# Patient Record
Sex: FEMALE | Race: BLACK OR AFRICAN AMERICAN | NOT HISPANIC OR LATINO | URBAN - METROPOLITAN AREA
[De-identification: names, ages, dates, MRNs, and addresses within clinical notes are randomized per-mention and may not be internally consistent; named-entity substitution may affect disease eponyms.]

---

## 2023-07-29 PROBLEM — O99.112: Status: ACTIVE | Noted: 2023-07-29

## 2023-07-29 PROBLEM — R03.0 BORDERLINE HYPERTENSION: Status: ACTIVE | Noted: 2023-07-29

## 2023-07-29 PROBLEM — D57.3 SICKLE CELL TRAIT (CMS-HCC): Status: ACTIVE | Noted: 2023-07-29

## 2023-07-29 PROBLEM — R30.0 DYSURIA: Status: ACTIVE | Noted: 2023-07-29

## 2023-07-29 PROBLEM — O99.210 OBESITY IN PREGNANCY (HHS-HCC): Status: RESOLVED | Noted: 2023-07-29 | Resolved: 2023-07-29

## 2023-07-29 PROBLEM — D68.59: Status: ACTIVE | Noted: 2023-07-29

## 2023-07-29 RX ORDER — DIAPER,BRIEF,ADULT, DISPOSABLE
EACH MISCELLANEOUS
COMMUNITY

## 2023-07-31 ENCOUNTER — OFFICE VISIT (OUTPATIENT)
Dept: PRIMARY CARE | Facility: CLINIC | Age: 28
End: 2023-07-31
Payer: MEDICAID

## 2023-07-31 VITALS
HEART RATE: 60 BPM | HEIGHT: 64 IN | BODY MASS INDEX: 35.85 KG/M2 | WEIGHT: 210 LBS | SYSTOLIC BLOOD PRESSURE: 107 MMHG | DIASTOLIC BLOOD PRESSURE: 72 MMHG

## 2023-07-31 DIAGNOSIS — L73.9 FOLLICULITIS: Primary | ICD-10-CM

## 2023-07-31 DIAGNOSIS — N76.0 BACTERIAL VAGINOSIS: ICD-10-CM

## 2023-07-31 DIAGNOSIS — B96.89 BACTERIAL VAGINOSIS: ICD-10-CM

## 2023-07-31 PROCEDURE — 99214 OFFICE O/P EST MOD 30 MIN: CPT | Performed by: FAMILY MEDICINE

## 2023-07-31 PROCEDURE — 1036F TOBACCO NON-USER: CPT | Performed by: FAMILY MEDICINE

## 2023-07-31 RX ORDER — SULFAMETHOXAZOLE AND TRIMETHOPRIM 800; 160 MG/1; MG/1
1 TABLET ORAL 2 TIMES DAILY
Qty: 20 TABLET | Refills: 0 | Status: SHIPPED | OUTPATIENT
Start: 2023-07-31 | End: 2023-08-10

## 2023-07-31 ASSESSMENT — ENCOUNTER SYMPTOMS
RESPIRATORY NEGATIVE: 1
NAUSEA: 0
ENDOCRINE NEGATIVE: 1
DEPRESSION: 0
CARDIOVASCULAR NEGATIVE: 1
CHILLS: 0
HEMATOLOGIC/LYMPHATIC NEGATIVE: 1
OCCASIONAL FEELINGS OF UNSTEADINESS: 0
LOSS OF SENSATION IN FEET: 0
MUSCULOSKELETAL NEGATIVE: 1
VOMITING: 0
GASTROINTESTINAL NEGATIVE: 1
ALLERGIC/IMMUNOLOGIC NEGATIVE: 1
PSYCHIATRIC NEGATIVE: 1
FEVER: 0
NEUROLOGICAL NEGATIVE: 1

## 2023-07-31 ASSESSMENT — PATIENT HEALTH QUESTIONNAIRE - PHQ9
SUM OF ALL RESPONSES TO PHQ9 QUESTIONS 1 AND 2: 0
1. LITTLE INTEREST OR PLEASURE IN DOING THINGS: NOT AT ALL
2. FEELING DOWN, DEPRESSED OR HOPELESS: NOT AT ALL

## 2023-07-31 NOTE — PROGRESS NOTES
"Subjective   Patient ID: Marylu Villela is a 27 y.o. female who presents for Follow-up (Vaginal acne ).      Female  Problem  The patient's primary symptoms include genital itching, genital lesions and a genital odor. This is a recurrent problem. The current episode started more than 1 month ago. The problem occurs intermittently. The problem has been waxing and waning. The patient is experiencing no pain. The problem affects both sides. She is not pregnant. Associated symptoms include rash. Pertinent negatives include no chills, fever, nausea or vomiting. Nothing aggravates the symptoms. She is sexually active with multiple partners. It is possible that her partner has an STD. She uses nothing and condoms for contraception. Her menstrual history has been regular.    Has had episodes of folluculitis of the mons pubis.   Also c/o vag odor  ariane after periods for 3 days   Current Outpatient Medications on File Prior to Visit   Medication Sig Dispense Refill    multivit-iron-min-folic acid (One Daily For Women) 18-0.4 mg tablet Take by mouth.       No current facility-administered medications on file prior to visit.        Review of Systems   Constitutional:  Negative for chills and fever.   HENT: Negative.     Respiratory: Negative.     Cardiovascular: Negative.    Gastrointestinal: Negative.  Negative for nausea and vomiting.   Endocrine: Negative.    Genitourinary: Negative.    Musculoskeletal: Negative.    Skin:  Positive for rash.   Allergic/Immunologic: Negative.    Neurological: Negative.    Hematological: Negative.    Psychiatric/Behavioral: Negative.     All other systems reviewed and are negative.      Objective   Blood Pressure 107/72   Pulse 60   Height 1.626 m (5' 4\")   Weight 95.3 kg (210 lb)   Body Mass Index 36.05 kg/m²   BSA: 2.07 meters squared  Growth percentiles: Facility age limit for growth %hayley is 20 years. Facility age limit for growth %hayley is 20 years.   No visits with results within " 1 Week(s) from this visit.   Latest known visit with results is:   Legacy Encounter on 10/27/2022   Component Date Value Ref Range Status    Neisseria gonorrhea,Amplified 10/27/2022 NEGATIVE  Negative Final    Comment:  The APTIMA Combo 2 assay is FDA-approved for Chlamydia    trachomatis and Neisseria gonorrhoeae testing on female    endocervical and vaginal swabs, ThinPrep liquid pap    samples, male urine samples and urethral swabs.    Performance characteristics for Chlamydia trachomatis and    Neisseria gonorrhoeae testing on specific non-FDA-approved    sample types (female urine samples) have been validated by    [facility]. This    laboratory is certified by CLIA to perform high complexity    testing. Samples from all other sites are not validated    for this method.  This assay has not been FDA-approved or validated for this sample type.    Results should be interpreted with caution in conjunction with additional   clinical and laboratory findings.       Chlamydia trachomatis, Amplified 10/27/2022 NEGATIVE  Negative Final    Comment:  The APTIMA Combo 2 assay is FDA-approved for Chlamydia    trachomatis and Neisseria gonorrhoeae testing on female    endocervical and vaginal swabs, ThinPrep liquid pap    samples, male urine samples and urethral swabs.    Performance characteristics for Chlamydia trachomatis and    Neisseria gonorrhoeae testing on specific non-FDA-approved    sample types (female urine samples) have been validated by    [facility]. This    laboratory is certified by CLIA to perform high complexity    testing. Samples from all other sites are not validated    for this method.  This assay has not been FDA-approved or validated for this sample type.    Results should be interpreted with caution in conjunction with additional   clinical and laboratory findings.       Trichomonas Vaginalis 10/27/2022 NEGATIVE  Negative Final     Comment:  The APTIMA Trichomonas vaginalis assay is FDA-approved for    testing on female endocervical swabs, vaginal swabs, and    ThinPrep liquid pap samples. Performance characteristics    for Trichomonas vaginalis on specific non-FDA-approved    sample types (female and male urine and male urethral    swabs) have been validated by [facility]. This laboratory is certified by    CLIA to perform high complexity testing. Samples from all    other sites are not validated for this method.  This assay has not been FDA-approved or validated for this sample type.    Results should be interpreted with caution in conjunction with additional   clinical and laboratory findings.         Physical Exam  Constitutional:       Appearance: Normal appearance.   HENT:      Head: Normocephalic and atraumatic.   Cardiovascular:      Rate and Rhythm: Normal rate and regular rhythm.   Pulmonary:      Effort: Pulmonary effort is normal.      Breath sounds: Normal breath sounds.   Neurological:      Mental Status: She is alert.         Assessment/Plan   Problem List Items Addressed This Visit       Folliculitis - Primary    Relevant Medications    sulfamethoxazole-trimethoprim (Bactrim DS) 800-160 mg tablet    Bacterial vaginosis     Boric acid suppositories. For prevention

## 2023-09-14 ENCOUNTER — OFFICE VISIT (OUTPATIENT)
Dept: PRIMARY CARE | Facility: CLINIC | Age: 28
End: 2023-09-14
Payer: MEDICAID

## 2023-09-14 VITALS
HEIGHT: 64 IN | DIASTOLIC BLOOD PRESSURE: 71 MMHG | HEART RATE: 62 BPM | SYSTOLIC BLOOD PRESSURE: 107 MMHG | BODY MASS INDEX: 36.02 KG/M2 | WEIGHT: 211 LBS

## 2023-09-14 DIAGNOSIS — Z11.3 SCREEN FOR STD (SEXUALLY TRANSMITTED DISEASE): ICD-10-CM

## 2023-09-14 DIAGNOSIS — L73.9 FOLLICULITIS: Primary | ICD-10-CM

## 2023-09-14 DIAGNOSIS — Z20.2 STD EXPOSURE: ICD-10-CM

## 2023-09-14 LAB
HEPATITIS B VIRUS SURFACE AG PRESENCE IN SERUM: NONREACTIVE
HIV 1/ 2 AG/AB SCREEN: NONREACTIVE
SYPHILIS TOTAL AB: NONREACTIVE

## 2023-09-14 PROCEDURE — 86780 TREPONEMA PALLIDUM: CPT

## 2023-09-14 PROCEDURE — 87340 HEPATITIS B SURFACE AG IA: CPT

## 2023-09-14 PROCEDURE — 99214 OFFICE O/P EST MOD 30 MIN: CPT | Performed by: FAMILY MEDICINE

## 2023-09-14 PROCEDURE — 87389 HIV-1 AG W/HIV-1&-2 AB AG IA: CPT

## 2023-09-14 PROCEDURE — 1036F TOBACCO NON-USER: CPT | Performed by: FAMILY MEDICINE

## 2023-09-14 PROCEDURE — 87661 TRICHOMONAS VAGINALIS AMPLIF: CPT

## 2023-09-14 PROCEDURE — 87591 N.GONORRHOEAE DNA AMP PROB: CPT

## 2023-09-14 PROCEDURE — 87491 CHLMYD TRACH DNA AMP PROBE: CPT

## 2023-09-14 ASSESSMENT — ENCOUNTER SYMPTOMS
LOSS OF SENSATION IN FEET: 0
OCCASIONAL FEELINGS OF UNSTEADINESS: 0
DEPRESSION: 0

## 2023-09-14 ASSESSMENT — PATIENT HEALTH QUESTIONNAIRE - PHQ9
1. LITTLE INTEREST OR PLEASURE IN DOING THINGS: NOT AT ALL
2. FEELING DOWN, DEPRESSED OR HOPELESS: NOT AT ALL
SUM OF ALL RESPONSES TO PHQ9 QUESTIONS 1 AND 2: 0

## 2023-09-15 ENCOUNTER — TELEPHONE (OUTPATIENT)
Dept: PRIMARY CARE | Facility: CLINIC | Age: 28
End: 2023-09-15
Payer: MEDICAID

## 2023-09-15 LAB
CHLAMYDIA TRACH., AMPLIFIED: NEGATIVE
N. GONORRHEA, AMPLIFIED: NEGATIVE
TRICHOMONAS VAGINALIS: NEGATIVE

## 2023-09-16 PROBLEM — Z20.2 STD EXPOSURE: Status: ACTIVE | Noted: 2023-09-16

## 2023-09-16 PROBLEM — Z11.3 SCREEN FOR STD (SEXUALLY TRANSMITTED DISEASE): Status: ACTIVE | Noted: 2023-09-16

## 2023-09-16 NOTE — PROGRESS NOTES
"Assessment and Plan:  Problem List Items Addressed This Visit       Folliculitis - Primary    STD exposure    Relevant Orders    HIV 1/2 Antigen/Antibody Screen with Reflex to Confirmation (Completed)    Syphilis Screen with Reflex (Completed)    Hepatitis B surface antigen (Completed)    C. Trachomatis / N. Gonorrhoeae, Amplified Detection (Completed)    Trichomonas vaginalis, Amplified (Completed)    Screen for STD (sexually transmitted disease)    Relevant Orders    HIV 1/2 Antigen/Antibody Screen with Reflex to Confirmation (Completed)    Syphilis Screen with Reflex (Completed)    Hepatitis B surface antigen (Completed)    C. Trachomatis / N. Gonorrhoeae, Amplified Detection (Completed)    Trichomonas vaginalis, Amplified (Completed)     We will recheck for STDs in 2 weeks or if symptoms appear    HPI:  Patient was exposed to STD the present treated with Bactrim for follow-up colitis which has resolved      ROS   Constitutional:  o weight loss. Negative for chills and fever.   HENT: Negative.     Respiratory: Negative.     Cardiovascular: Negative.    Gastrointestinal: Negative.  Negative for nausea and vomiting.   Endocrine: Negative.    Genitourinary: Negative.    Musculoskeletal: Negative.    Skin: Negative.  Negative for rash.   Allergic/Immunologic: Negative.    Neurological: Negative.    Hematological: Negative.    Psychiatric/Behavioral: Negative.     All other systems reviewed and are negative.      Blood Pressure 107/71   Pulse 62   Height 1.626 m (5' 4\")   Weight 95.7 kg (211 lb)   Body Mass Index 36.22 kg/m²   Body mass index is 36.22 kg/m².  Physical Exam    ENT:      Head: Normocephalic and atraumatic.      Right Ear: Tympanic membrane normal.      Left Ear: Tympanic membrane normal.      Nose: Nose normal.      Mouth/Throat:      Mouth: Mucous membranes are moist.   Eyes:      Pupils: Pupils are equal, round, and reactive to light.   Cardiovascular:      Rate and Rhythm: Normal rate and regular " rhythm.      Pulses: Normal pulses.      Heart sounds: Normal heart sounds.   Pulmonary:      Effort: Pulmonary effort is normal.      Breath sounds: Normal breath sounds.   Abdominal:      General: Abdomen is flat. Bowel sounds are normal.      Palpations: Abdomen is soft.   Musculoskeletal:         General: Normal range of motion.      Cervical back: Normal range of motion and neck supple.   Skin:     General: Skin is warm and dry.      Capillary Refill: Capillary refill takes less than 2 seconds.   Neurological:      General: No focal deficit present.      Mental Status: She is alert and oriented to person, place, and time.   Psychiatric:         Mood and Affect: Mood normal.       Active Problem List  Patient Active Problem List   Diagnosis    Borderline hypertension    Dysuria    Sickle cell trait (CMS/HCC)    Thrombophilia during pregnancy in second trimester (CMS/HCC)    Refractive error    Folliculitis    Bacterial vaginosis    STD exposure    Screen for STD (sexually transmitted disease)       Comprehensive Medical/Surgical/Social/Family History  Past Medical History:   Diagnosis Date    Obesity in pregnancy 07/29/2023     No past surgical history on file.  Social History     Social History Narrative    Not on file       Allergies and Medications  Ibuprofen and Naproxen sodium  Current Outpatient Medications   Medication Instructions    multivit-iron-min-folic acid (One Daily For Women) 18-0.4 mg tablet oral

## 2023-12-19 ENCOUNTER — OFFICE VISIT (OUTPATIENT)
Dept: PRIMARY CARE | Facility: CLINIC | Age: 28
End: 2023-12-19
Payer: MEDICAID

## 2023-12-19 VITALS
WEIGHT: 206 LBS | HEIGHT: 64 IN | BODY MASS INDEX: 35.17 KG/M2 | DIASTOLIC BLOOD PRESSURE: 79 MMHG | SYSTOLIC BLOOD PRESSURE: 116 MMHG | HEART RATE: 83 BPM

## 2023-12-19 DIAGNOSIS — Z20.2 STD EXPOSURE: ICD-10-CM

## 2023-12-19 DIAGNOSIS — K59.00 CONSTIPATION, UNSPECIFIED CONSTIPATION TYPE: ICD-10-CM

## 2023-12-19 DIAGNOSIS — D57.3 SICKLE CELL TRAIT (CMS-HCC): ICD-10-CM

## 2023-12-19 DIAGNOSIS — N93.9 ABNORMAL UTERINE BLEEDING (AUB): ICD-10-CM

## 2023-12-19 DIAGNOSIS — Z11.3 SCREEN FOR STD (SEXUALLY TRANSMITTED DISEASE): ICD-10-CM

## 2023-12-19 DIAGNOSIS — Z00.00 ROUTINE GENERAL MEDICAL EXAMINATION AT A HEALTH CARE FACILITY: Primary | ICD-10-CM

## 2023-12-19 LAB
ERYTHROCYTE [DISTWIDTH] IN BLOOD BY AUTOMATED COUNT: 13.2 % (ref 11.5–14.5)
HCT VFR BLD AUTO: 37.7 % (ref 36–46)
HGB BLD-MCNC: 12.4 G/DL (ref 12–16)
MCH RBC QN AUTO: 30.2 PG (ref 26–34)
MCHC RBC AUTO-ENTMCNC: 32.9 G/DL (ref 32–36)
MCV RBC AUTO: 92 FL (ref 80–100)
NRBC BLD-RTO: 0 /100 WBCS (ref 0–0)
PLATELET # BLD AUTO: 279 X10*3/UL (ref 150–450)
RBC # BLD AUTO: 4.11 X10*6/UL (ref 4–5.2)
WBC # BLD AUTO: 9.2 X10*3/UL (ref 4.4–11.3)

## 2023-12-19 PROCEDURE — 84702 CHORIONIC GONADOTROPIN TEST: CPT

## 2023-12-19 PROCEDURE — 87389 HIV-1 AG W/HIV-1&-2 AB AG IA: CPT

## 2023-12-19 PROCEDURE — 1036F TOBACCO NON-USER: CPT | Performed by: FAMILY MEDICINE

## 2023-12-19 PROCEDURE — 85027 COMPLETE CBC AUTOMATED: CPT

## 2023-12-19 PROCEDURE — 36415 COLL VENOUS BLD VENIPUNCTURE: CPT

## 2023-12-19 PROCEDURE — 82607 VITAMIN B-12: CPT

## 2023-12-19 PROCEDURE — 84443 ASSAY THYROID STIM HORMONE: CPT

## 2023-12-19 PROCEDURE — 80061 LIPID PANEL: CPT

## 2023-12-19 PROCEDURE — 82306 VITAMIN D 25 HYDROXY: CPT

## 2023-12-19 PROCEDURE — 86780 TREPONEMA PALLIDUM: CPT

## 2023-12-19 PROCEDURE — 80053 COMPREHEN METABOLIC PANEL: CPT

## 2023-12-19 PROCEDURE — 99395 PREV VISIT EST AGE 18-39: CPT | Performed by: FAMILY MEDICINE

## 2023-12-19 RX ORDER — HYDROCORTISONE 25 MG/G
CREAM TOPICAL 2 TIMES DAILY
Qty: 28 G | Refills: 1 | Status: SHIPPED | OUTPATIENT
Start: 2023-12-19 | End: 2024-05-11 | Stop reason: ALTCHOICE

## 2023-12-19 ASSESSMENT — ENCOUNTER SYMPTOMS
DEPRESSION: 0
LOSS OF SENSATION IN FEET: 0
OCCASIONAL FEELINGS OF UNSTEADINESS: 0

## 2023-12-19 NOTE — PROGRESS NOTES
"  Subjective     Patient ID: Marylu Villela is a 28 y.o. female who presents for Annual Exam.      Last Physical :  1 Years ago     Pt's PMH, PSH, SH, FH , meds and allergies was obtained / reviewed and updated .     Dental visits : Y  Vision issues : N  Hearing issues : N    Immunizations : Y    Diet :  could be better  Exercise:  Weight concerns :     Alcohol: as noted in SH  Tobacco: as noted in SH  Recreational drug use : None/ as noted in SH    Sexually active : Active   Contraception :   Menstrual problems:  Premenopausal/perimenopausal/ postmenopausal:    G:  Parity:  Full term:    Premature:   (s):   Living :  Ab induced:   Ab spontaneous :  Ectopic :   Multiple :    PAP smear :  Mammogram :  Colonoscopy:    Metabolic screening   - Lipid   - Glucose    Preg  Light period last  Friday  Nausea constipation.     ======================================================    Visit Vitals  Blood Pressure 116/79   Pulse 83   Height 1.626 m (5' 4\")   Weight 93.4 kg (206 lb)   Body Mass Index 35.36 kg/m²   Smoking Status Never   Body Surface Area 2.05 m²      No LMP recorded.     =====================================    Review of systems:  Constitutional: no chills, no fever and no night sweats.     Eyes: no blurred vision and no eyesight problems.     ENT: no hearing loss, no nasal congestion, no nasal discharge, no hoarseness and no sore throat.     Cardiovascular: no chest pain, no intermittent leg claudication, no lower extremity edema, no palpitations and no syncope.     Respiratory: no cough, no shortness of breath during exertion, no shortness of breath at rest and no wheezing.     Gastrointestinal: no abdominal pain, no blood in stools, no constipation, no diarrhea, no melena, no nausea, no rectal pain and no vomiting.     Genitourinary: no dysuria, no change in urinary frequency, no urinary hesitancy, no feelings of urinary urgency and no vaginal discharge.     Musculoskeletal: no arthralgias, no " back pain and no myalgias.     Integumentary: no new skin lesions and no rashes.     Neurological: no difficulty walking, no headache, no limb weakness, no numbness and no tingling.     Psychiatric: no anxiety, no depression, no anhedonia and no substance use disorders.     Endocrine: no recent weight gain and no recent weight loss.     Hematologic/Lymphatic: no tendency for easy bruising and no swollen glands.   ============================================================    Physical exam :    Constitutional: Alert and in no acute distress. Well developed, well nourished.     Eyes: Normal external exam. Pupils were equal in size, round, reactive to light (PERRL) with normal accommodation and extraocular movements intact (EOMI).     Ears, Nose, Mouth, and Throat: External inspection of ears and nose: Normal.  Otoscopic examination: Normal.      Neck: No neck mass was observed. Supple.     Cardiovascular: Heart rate and rhythm were normal, normal S1 and S2, no gallops, no murmurs and no pericardial rub    Pulmonary: No respiratory distress. Clear bilateral breath sounds.     Abdomen: Soft nontender; no abdominal mass palpated. No organomegaly.     Musculoskeletal: No joint swelling seen, normal movements of all extremities. Range of motion: Normal.  Muscle strength/tone: Normal.      Skin: Normal skin color and pigmentation, normal skin turgor, and no rash.     Neurologic: Deep tendon reflexes were 2+ and symmetric. Sensation: Normal.     Psychiatric: Judgment and insight: Intact. Mood and affect: Normal.    Lymphatic : Cervical/ axillary/ groin Lns Palpable/ non palpable            All other systems have been reviewed and are negative for complaint.      Assessment/Plan    Problem List Items Addressed This Visit             ICD-10-CM    Sickle cell trait (CMS/HCC) D57.3    Relevant Orders    Vitamin D 25-Hydroxy,Total (for eval of Vitamin D levels)    Constipation K59.00    Relevant Medications    hydrocortisone  (Anusol-HC) 2.5 % rectal cream    Routine general medical examination at a health care facility - Primary Z00.00    Relevant Orders    TSH with reflex to Free T4 if abnormal    Lipid Panel    Comprehensive Metabolic Panel    CBC    Abnormal uterine bleeding (AUB) N93.9    Relevant Orders    Vitamin D 25-Hydroxy,Total (for eval of Vitamin D levels)    Vitamin B12    HCG, quantitative, pregnancy

## 2023-12-20 DIAGNOSIS — E55.9 VITAMIN D DEFICIENCY: ICD-10-CM

## 2023-12-20 LAB
25(OH)D3 SERPL-MCNC: 15 NG/ML (ref 30–100)
ALBUMIN SERPL BCP-MCNC: 4.4 G/DL (ref 3.4–5)
ALP SERPL-CCNC: 40 U/L (ref 33–110)
ALT SERPL W P-5'-P-CCNC: 13 U/L (ref 7–45)
ANION GAP SERPL CALC-SCNC: 11 MMOL/L (ref 10–20)
AST SERPL W P-5'-P-CCNC: 12 U/L (ref 9–39)
B-HCG SERPL-ACNC: <3 MIU/ML
BILIRUB SERPL-MCNC: 0.4 MG/DL (ref 0–1.2)
BUN SERPL-MCNC: 15 MG/DL (ref 6–23)
CALCIUM SERPL-MCNC: 9.7 MG/DL (ref 8.6–10.6)
CHLORIDE SERPL-SCNC: 105 MMOL/L (ref 98–107)
CHOLEST SERPL-MCNC: 105 MG/DL (ref 0–199)
CHOLESTEROL/HDL RATIO: 2.4
CO2 SERPL-SCNC: 24 MMOL/L (ref 21–32)
CREAT SERPL-MCNC: 0.76 MG/DL (ref 0.5–1.05)
GFR SERPL CREATININE-BSD FRML MDRD: >90 ML/MIN/1.73M*2
GLUCOSE SERPL-MCNC: 96 MG/DL (ref 74–99)
HDLC SERPL-MCNC: 43.2 MG/DL
LDLC SERPL CALC-MCNC: 53 MG/DL
NON HDL CHOLESTEROL: 62 MG/DL (ref 0–149)
POTASSIUM SERPL-SCNC: 4.2 MMOL/L (ref 3.5–5.3)
PROT SERPL-MCNC: 7.4 G/DL (ref 6.4–8.2)
SODIUM SERPL-SCNC: 136 MMOL/L (ref 136–145)
TRIGL SERPL-MCNC: 45 MG/DL (ref 0–149)
TSH SERPL-ACNC: 1.56 MIU/L (ref 0.44–3.98)
VIT B12 SERPL-MCNC: 695 PG/ML (ref 211–911)
VLDL: 9 MG/DL (ref 0–40)

## 2023-12-21 LAB
HIV 1+2 AB+HIV1 P24 AG SERPL QL IA: NONREACTIVE
T PALLIDUM AB SER QL: NONREACTIVE

## 2023-12-22 ENCOUNTER — APPOINTMENT (OUTPATIENT)
Dept: PRIMARY CARE | Facility: CLINIC | Age: 28
End: 2023-12-22
Payer: MEDICAID

## 2023-12-26 RX ORDER — ERGOCALCIFEROL 1.25 MG/1
50000 CAPSULE ORAL
Qty: 13 CAPSULE | Refills: 1 | Status: SHIPPED | OUTPATIENT
Start: 2023-12-26 | End: 2024-05-11 | Stop reason: SDDI

## 2024-05-10 ENCOUNTER — OFFICE VISIT (OUTPATIENT)
Dept: PRIMARY CARE | Facility: CLINIC | Age: 29
End: 2024-05-10
Payer: MEDICAID

## 2024-05-10 VITALS
BODY MASS INDEX: 35.3 KG/M2 | OXYGEN SATURATION: 95 % | DIASTOLIC BLOOD PRESSURE: 69 MMHG | HEART RATE: 55 BPM | SYSTOLIC BLOOD PRESSURE: 106 MMHG | WEIGHT: 206.8 LBS | RESPIRATION RATE: 18 BRPM | HEIGHT: 64 IN

## 2024-05-10 DIAGNOSIS — Z00.00 ROUTINE GENERAL MEDICAL EXAMINATION AT A HEALTH CARE FACILITY: ICD-10-CM

## 2024-05-10 DIAGNOSIS — Z3A.01 LESS THAN 8 WEEKS GESTATION OF PREGNANCY (HHS-HCC): Primary | ICD-10-CM

## 2024-05-10 DIAGNOSIS — O21.9 NAUSEA AND VOMITING DURING PREGNANCY PRIOR TO 22 WEEKS GESTATION (HHS-HCC): ICD-10-CM

## 2024-05-10 LAB
POC APPEARANCE, URINE: ABNORMAL
POC BILIRUBIN, URINE: ABNORMAL
POC BLOOD, URINE: NEGATIVE
POC COLOR, URINE: YELLOW
POC GLUCOSE, URINE: NEGATIVE MG/DL
POC KETONES, URINE: ABNORMAL MG/DL
POC LEUKOCYTES, URINE: NEGATIVE
POC NITRITE,URINE: NEGATIVE
POC PH, URINE: 6.5 PH
POC PROTEIN, URINE: ABNORMAL MG/DL
POC SPECIFIC GRAVITY, URINE: 1.01
POC UROBILINOGEN, URINE: 0.2 EU/DL
PREGNANCY TEST URINE, POC: POSITIVE

## 2024-05-10 PROCEDURE — 81025 URINE PREGNANCY TEST: CPT | Performed by: FAMILY MEDICINE

## 2024-05-10 PROCEDURE — 81002 URINALYSIS NONAUTO W/O SCOPE: CPT | Performed by: FAMILY MEDICINE

## 2024-05-10 PROCEDURE — 99214 OFFICE O/P EST MOD 30 MIN: CPT | Performed by: FAMILY MEDICINE

## 2024-05-10 RX ORDER — SYRINGE WITH NEEDLE, INSULIN
SYRINGE, EMPTY DISPOSABLE MISCELLANEOUS
Qty: 100 TABLET | Refills: 3 | Status: SHIPPED | OUTPATIENT
Start: 2024-05-10

## 2024-05-10 RX ORDER — ONDANSETRON 4 MG/1
4 TABLET, FILM COATED ORAL EVERY 8 HOURS PRN
Qty: 40 TABLET | Refills: 0 | Status: SHIPPED | OUTPATIENT
Start: 2024-05-10 | End: 2024-05-30

## 2024-05-10 RX ORDER — ACETAMINOPHEN 500 MG
2000 TABLET ORAL DAILY
Qty: 100 CAPSULE | Refills: 3
Start: 2024-05-10 | End: 2025-05-05

## 2024-05-10 NOTE — PROGRESS NOTES
"Subjective   Patient ID: Marylu Villela is a 28 y.o. female who presents for New Patient Visit.      HPI patient is here for follow-up and acute visit states she had a positive pregnancy test at home last menstrual period was at the end of March expected date of confinement is  patient has had amenorrhea for 6 weeks has made appointment with GYN has not started prenatal vitamins yet complains of nausea which has been persistent  Eating healthy understands to gain too much weight has a 6-year-old pregnancy was uneventful [place]  due to prolonged labor  Current Outpatient Medications on File Prior to Visit   Medication Sig Dispense Refill    ergocalciferol (Vitamin D-2) 1.25 MG (72185 UT) capsule Take 1 capsule (50,000 Units) by mouth 1 (one) time per week. (Patient not taking: Reported on 5/10/2024) 13 capsule 1    hydrocortisone (Anusol-HC) 2.5 % rectal cream Insert into the rectum 2 times a day. Apply to affected areas (Patient not taking: Reported on 5/10/2024) 28 g 1    multivit-iron-min-folic acid (One Daily For Women) 18-0.4 mg tablet Take by mouth.       No current facility-administered medications on file prior to visit.        Review of Systems   Constitutional:  Negative for chills and fever.   HENT: Negative.     Respiratory: Negative.     Cardiovascular: Negative.    Gastrointestinal: Negative.  Negative for nausea and vomiting.   Endocrine: Negative.    Genitourinary: Negative.    Musculoskeletal: Negative.    Skin: Negative.  Negative for rash.   Allergic/Immunologic: Negative.    Neurological: Negative.    Hematological: Negative.    Psychiatric/Behavioral: Negative.     All other systems reviewed and are negative.      Objective   /69 (BP Location: Right arm, Patient Position: Sitting, BP Cuff Size: Adult)   Pulse 55   Resp 18   Ht 1.626 m (5' 4\")   Wt 93.8 kg (206 lb 12.8 oz)   LMP  (LMP Unknown)   SpO2 95%   BMI 35.50 kg/m²   BSA: 2.06 meters squared  Growth " percentiles: Facility age limit for growth %hayley is 20 years. Facility age limit for growth %hayley is 20 years.   Office Visit on 05/10/2024   Component Date Value Ref Range Status    POC Color, Urine 05/10/2024 Yellow  Straw, Yellow, Light-Yellow Final    POC Appearance, Urine 05/10/2024 Cloudy (A)  Clear Final    POC Glucose, Urine 05/10/2024 NEGATIVE  NEGATIVE mg/dl Final    POC Bilirubin, Urine 05/10/2024 SMALL (1+) (A)  NEGATIVE Final    POC Ketones, Urine 05/10/2024 TRACE (A)  NEGATIVE mg/dl Final    POC Specific Gravity, Urine 05/10/2024 1.015  1.005 - 1.035 Final    POC Blood, Urine 05/10/2024 NEGATIVE  NEGATIVE Final    POC PH, Urine 05/10/2024 6.5  No Reference Range Established PH Final    POC Protein, Urine 05/10/2024 TRACE (A)  NEGATIVE, 30 (1+) mg/dl Final    POC Urobilinogen, Urine 05/10/2024 0.2  0.2, 1.0 EU/DL Final    Poc Nitrite, Urine 05/10/2024 NEGATIVE  NEGATIVE Final    POC Leukocytes, Urine 05/10/2024 NEGATIVE  NEGATIVE Final    Preg Test, Ur 05/10/2024 Positive (A)  Negative Final      Physical Exam  Constitutional:       Appearance: Normal appearance.   HENT:      Right Ear: Tympanic membrane normal.      Left Ear: Tympanic membrane normal.      Nose: Nose normal.      Mouth/Throat:      Mouth: Mucous membranes are moist.   Eyes:      Pupils: Pupils are equal, round, and reactive to light.   Cardiovascular:      Rate and Rhythm: Normal rate and regular rhythm.   Pulmonary:      Effort: Pulmonary effort is normal.      Breath sounds: Normal breath sounds.   Abdominal:      General: Bowel sounds are normal.   Musculoskeletal:         General: Normal range of motion.   Neurological:      General: No focal deficit present.      Mental Status: She is alert.   Psychiatric:         Mood and Affect: Mood normal.         Assessment/Plan   Problem List Items Addressed This Visit             ICD-10-CM    Routine general medical examination at a health care facility Z00.00    Relevant Orders    POCT UA  (nonautomated) manually resulted (Completed)    POCT Pregnancy, Urine manually resulted (Completed)    Less than 8 weeks gestation of pregnancy ([facility]) - Primary Z3A.01     Started prenatal vitamins has appointment with GYN at the end of the month continue to eat healthy and stay active monitor for cramping no bleeding         Relevant Medications    PNV133-ferrous fumarate-FA (Prenatal)  mg-mcg tablet    cholecalciferol (Vitamin D3) 50 mcg (2,000 unit) capsule    ondansetron (Zofran) 4 mg tablet    Nausea and vomiting during pregnancy prior to 22 weeks gestation ([facility]) O21.9     Patient was given Zofran prescription to take as needed

## 2024-05-11 PROBLEM — Z3A.01 LESS THAN 8 WEEKS GESTATION OF PREGNANCY (HHS-HCC): Status: ACTIVE | Noted: 2024-05-11

## 2024-05-11 PROBLEM — O21.9: Status: ACTIVE | Noted: 2024-05-11

## 2024-05-11 ASSESSMENT — ENCOUNTER SYMPTOMS
ENDOCRINE NEGATIVE: 1
NAUSEA: 0
VOMITING: 0
CHILLS: 0
FEVER: 0
ALLERGIC/IMMUNOLOGIC NEGATIVE: 1
NEUROLOGICAL NEGATIVE: 1
MUSCULOSKELETAL NEGATIVE: 1
RESPIRATORY NEGATIVE: 1
PSYCHIATRIC NEGATIVE: 1
GASTROINTESTINAL NEGATIVE: 1
HEMATOLOGIC/LYMPHATIC NEGATIVE: 1
CARDIOVASCULAR NEGATIVE: 1

## 2024-05-11 NOTE — ASSESSMENT & PLAN NOTE
Started prenatal vitamins has appointment with GYN at the end of the month continue to eat healthy and stay active monitor for cramping no bleeding

## 2025-03-31 ENCOUNTER — APPOINTMENT (OUTPATIENT)
Dept: PRIMARY CARE | Facility: CLINIC | Age: 30
End: 2025-03-31
Payer: MEDICAID